# Patient Record
Sex: FEMALE | Race: BLACK OR AFRICAN AMERICAN | NOT HISPANIC OR LATINO | Employment: FULL TIME | URBAN - METROPOLITAN AREA
[De-identification: names, ages, dates, MRNs, and addresses within clinical notes are randomized per-mention and may not be internally consistent; named-entity substitution may affect disease eponyms.]

---

## 2020-03-10 ENCOUNTER — HOSPITAL ENCOUNTER (EMERGENCY)
Facility: OTHER | Age: 25
Discharge: HOME | End: 2020-03-11
Attending: EMERGENCY MEDICINE

## 2020-03-10 DIAGNOSIS — M79.622 LEFT UPPER ARM PAIN: ICD-10-CM

## 2020-03-10 DIAGNOSIS — R07.89 LEFT-SIDED CHEST WALL PAIN: Primary | ICD-10-CM

## 2020-03-10 DIAGNOSIS — R07.9 CHEST PAIN: ICD-10-CM

## 2020-03-10 LAB
B-HCG UR QL: NEGATIVE
CTP QC/QA: YES
EXTRA GREEN TOP RAINBOW: NORMAL
EXTRA LAVENDER TOP RAINBOW: NORMAL
EXTRA RED TOP RAINBOW: NORMAL

## 2020-03-10 PROCEDURE — 81025 URINE PREGNANCY TEST: CPT | Performed by: EMERGENCY MEDICINE

## 2020-03-10 PROCEDURE — 99284 EMERGENCY DEPT VISIT MOD MDM: CPT | Mod: 25

## 2020-03-10 PROCEDURE — 85379 FIBRIN DEGRADATION QUANT: CPT

## 2020-03-10 PROCEDURE — 93005 ELECTROCARDIOGRAM TRACING: CPT

## 2020-03-10 PROCEDURE — 25000003 PHARM REV CODE 250: Performed by: EMERGENCY MEDICINE

## 2020-03-10 RX ORDER — NORGESTIMATE AND ETHINYL ESTRADIOL 0.25-0.035
1 KIT ORAL DAILY
COMMUNITY

## 2020-03-10 RX ORDER — NAPROXEN 500 MG/1
500 TABLET ORAL
Status: COMPLETED | OUTPATIENT
Start: 2020-03-10 | End: 2020-03-10

## 2020-03-10 RX ADMIN — NAPROXEN 500 MG: 500 TABLET ORAL at 11:03

## 2020-03-11 VITALS
HEIGHT: 64 IN | HEART RATE: 61 BPM | BODY MASS INDEX: 22.02 KG/M2 | TEMPERATURE: 98 F | SYSTOLIC BLOOD PRESSURE: 109 MMHG | OXYGEN SATURATION: 100 % | RESPIRATION RATE: 16 BRPM | DIASTOLIC BLOOD PRESSURE: 68 MMHG | WEIGHT: 129 LBS

## 2020-03-11 LAB — D DIMER PPP IA.FEU-MCNC: <0.19 MG/L FEU

## 2020-03-11 RX ORDER — IBUPROFEN 600 MG/1
600 TABLET ORAL EVERY 8 HOURS PRN
Qty: 20 TABLET | Refills: 0 | Status: SHIPPED | OUTPATIENT
Start: 2020-03-11

## 2020-03-11 NOTE — ED TRIAGE NOTES
Pt. Presents to the ER with left upper arm and shoulder pain that has been going on and been intermittent for the past week or so. Pt. Denies any trauma or any injury to the affected area. Pt. Just says that the pain feels like a squeezing to her bicep. Pt. Denies any other symptoms. Pt. Is otherwise stable at this time. Pt. AAOx4. GCS 15. Pt. Breathing even and nonlabored. Pt. Ambulatory to recliner 15 with strong steady gait. Neurovascular status intact.

## 2020-03-11 NOTE — ED PROVIDER NOTES
Encounter Date: 3/10/2020    SCRIBE #1 NOTE: I, Cayden Rosario, am scribing for, and in the presence of, Dr. Verduzco.       History     Chief Complaint   Patient presents with    Arm Pain     to left arm on bicep on and off fro about a week, denies trauma or heavy lifting     Time seen by provider: 11:18 PM    This is a 25 y.o. female who presents with complaint of intermittent left arm pain that began one week ago She is also experiencing chest pain, dizziness, and bilateral ear pressure. She reports that these episodes normally occur while she it at work and last for a few hours at a time. She notes that all of her symptoms occur all at one time. 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 She states that once she is at home and lays down to sleep that her pain will eventually go away. She denies fever, sore throat, cough, shortness of breath, nausea, vomiting, and dysuria. She recently started birth control a month ago. She admits to vaping nicotine. She denies the use of cocaine or any other illicit drug use. Her father passed away from a heart attack at the age of 58.     The history is provided by the patient.     Review of patient's allergies indicates:   Allergen Reactions    Amoxicillin-pot clavulanate Hives    Samson Hives     History reviewed. No pertinent past medical history.  History reviewed. No pertinent surgical history.  History reviewed. No pertinent family history.  Social History     Tobacco Use    Smoking status: Current Every Day Smoker     Types: Vaping with nicotine   Substance Use Topics    Alcohol use: Yes     Comment: social    Drug use: Never     Review of Systems   Constitutional: Negative for fever.   HENT: Positive for ear pain (pressure bilateral). Negative for sore throat.    Respiratory: Negative for shortness of breath.    Cardiovascular: Positive for chest pain.   Gastrointestinal: Negative for nausea and vomiting.   Genitourinary:  Negative for dysuria.   Musculoskeletal: Negative for back pain.        Positive for LUE pain.    Skin: Negative for rash.   Neurological: Positive for dizziness. Negative for weakness.   Hematological: Does not bruise/bleed easily.       Physical Exam     Initial Vitals [03/10/20 2259]   BP Pulse Resp Temp SpO2   (!) 142/93 70 16 99.2 °F (37.3 °C) 100 %      MAP       --         Physical Exam    Nursing note and vitals reviewed.  Constitutional: She appears well-developed and well-nourished. She is not diaphoretic. No distress.   HENT:   Head: Normocephalic and atraumatic.   Mouth/Throat: Oropharynx is clear and moist.   Eyes: EOM are normal. Pupils are equal, round, and reactive to light. Right eye exhibits no discharge. Left eye exhibits no discharge.   Neck: Normal range of motion.   Cardiovascular: Normal rate, regular rhythm and normal heart sounds. Exam reveals no gallop and no friction rub.    No murmur heard.  Pulses:       Radial pulses are 2+ on the right side, and 2+ on the left side.   Pulmonary/Chest: Breath sounds normal. No respiratory distress. She has no wheezes. She has no rhonchi. She has no rales. She exhibits tenderness.   Left chest wall tenderness.   Abdominal: Soft. There is no tenderness. There is no rebound and no guarding.   Musculoskeletal: Normal range of motion. She exhibits no edema or tenderness.   FROM of bilateral upper extremities. Soft compartments. No edema.    Neurological: She is alert and oriented to person, place, and time.   Skin: Skin is warm and dry. No rash and no abscess noted. No erythema. No pallor.   Psychiatric: She has a normal mood and affect. Her behavior is normal. Judgment and thought content normal.         ED Course   Procedures  Labs Reviewed   D DIMER, QUANTITATIVE   GREEN-TOP TUBE   LAVENDER-TOP TUBE   RED-TOP TUBE   POCT URINE PREGNANCY     EKG Readings: (Independently Interpreted)   Normal sinus rhythm at a rate of 65 bpm. Normal intervals. No ST or T  wave changes.        Imaging Results    None          Medical Decision Making:   History:   Old Medical Records: I decided to obtain old medical records.  Independently Interpreted Test(s):   I have ordered and independently interpreted EKG Reading(s) - see prior notes  Clinical Tests:   Lab Tests: Ordered and Reviewed  Medical Tests: Ordered and Reviewed    Additional MDM:   Comments: 25-year-old healthy female presents complaining of left-sided upper arm pain as well as left-sided chest and upper back pain.  Patient is primarily concerned that this may be heart related.  On exam the pain was reproducible only with palpation of the chest.  She is low risk for ACS.  She is on OCPs, therefore, a D-dimer was obtained which was negative.  EKG showed no evidence of dysrhythmia or ischemic changes.  Pain resolved with naproxen.  Patient discharged home stable condition to follow up primary care if symptoms recur..          Scribe Attestation:   Scribe #1: I performed the above scribed service and the documentation accurately describes the services I performed. I attest to the accuracy of the note.    Attending Attestation:           Physician Attestation for Scribe:  Physician Attestation Statement for Scribe #1: I, Dr. Verduzco, reviewed documentation, as scribed by Cayden Rosario in my presence, and it is both accurate and complete.                               Clinical Impression:     1. Left-sided chest wall pain    2. Chest pain    3. Left upper arm pain              ED Disposition Condition    Discharge Stable        ED Prescriptions     Medication Sig Dispense Start Date End Date Auth. Provider    ibuprofen (ADVIL,MOTRIN) 600 MG tablet Take 1 tablet (600 mg total) by mouth every 8 (eight) hours as needed for Pain. 20 tablet 3/11/2020  Cate Verduzco MD        Follow-up Information     Follow up With Specialties Details Why Contact Info    Your primary care doctor  Schedule an appointment as soon as possible for a  visit  If symptoms persist     [facility], [place] Go to  If symptoms worsen [address]  [facility] [id number] [phone]                                     Cate Verduzco MD  03/11/20 7963